# Patient Record
Sex: FEMALE | Race: ASIAN | NOT HISPANIC OR LATINO | URBAN - METROPOLITAN AREA
[De-identification: names, ages, dates, MRNs, and addresses within clinical notes are randomized per-mention and may not be internally consistent; named-entity substitution may affect disease eponyms.]

---

## 2018-04-23 ENCOUNTER — EMERGENCY (EMERGENCY)
Facility: HOSPITAL | Age: 53
LOS: 1 days | Discharge: HOME | End: 2018-04-23
Attending: EMERGENCY MEDICINE | Admitting: EMERGENCY MEDICINE
Payer: MEDICAID

## 2018-04-23 VITALS
SYSTOLIC BLOOD PRESSURE: 160 MMHG | HEART RATE: 88 BPM | OXYGEN SATURATION: 100 % | TEMPERATURE: 98 F | RESPIRATION RATE: 16 BRPM | DIASTOLIC BLOOD PRESSURE: 82 MMHG

## 2018-04-23 PROCEDURE — 99283 EMERGENCY DEPT VISIT LOW MDM: CPT

## 2018-04-23 RX ADMIN — Medication 600 MILLIGRAM(S): at 19:13

## 2018-04-23 NOTE — ED PROVIDER NOTE - MEDICAL DECISION MAKING DETAILS
51y/o F with abscess and cellulitis. Abscess drained in a dermatology office. Recommend Clindamycin to cover for possible MRSA as oral Clindamycin has same bioavailability as IV. Will give oral Clindamycin. Pt given strict return precautions, area marked off. 51y/o F with abscess and cellulitis. Abscess drained in a dermatology office. Recommend Clindamycin to cover for possible MRSA.  As oral Clindamycin has same bioavailability as IV, Will give oral Clindamycin. Pt given strict return precautions, area marked off.

## 2018-04-23 NOTE — ED PROVIDER NOTE - PROGRESS NOTE DETAILS
RONAN Vargas: pt with right shoulder cellulitis after pimple to area. area marked. started on clinda. fu derm. return precautions.

## 2018-04-23 NOTE — ED PROVIDER NOTE - SKIN WOUND DESCRIPTION
Large area of erythema and induration over the R posterior shoulder with a small open area, not actively draining, tender to palpation.

## 2018-04-23 NOTE — ED PROVIDER NOTE - OBJECTIVE STATEMENT
53y/o F with no pertinent PMHx presents to the ED with abscess to R shoulder with pain, surrounding erythema, and purulent drainage with associated subjective fever. Pt noticed a pimple to her back 1w ago, which became enlarged with surrounding erythema and pain. Pt noted some purulent drainage, which she mostly squeezed out herself. Pt visited a dermatologist and was told to present to the ED for possible IV abx. Denies vomiting, any other complaints. NKDA.

## 2024-05-18 ENCOUNTER — EMERGENCY (EMERGENCY)
Facility: HOSPITAL | Age: 59
LOS: 1 days | Discharge: HOME | End: 2024-05-18
Admitting: EMERGENCY MEDICINE
Payer: MEDICAID

## 2024-05-18 VITALS
OXYGEN SATURATION: 100 % | RESPIRATION RATE: 16 BRPM | HEART RATE: 82 BPM | TEMPERATURE: 99 F | DIASTOLIC BLOOD PRESSURE: 75 MMHG | SYSTOLIC BLOOD PRESSURE: 146 MMHG

## 2024-05-18 PROCEDURE — 99283 EMERGENCY DEPT VISIT LOW MDM: CPT

## 2024-05-18 RX ORDER — DIPHENHYDRAMINE HCL 50 MG
1 CAPSULE ORAL
Qty: 28 | Refills: 0
Start: 2024-05-18 | End: 2024-05-24

## 2024-05-18 RX ORDER — DEXAMETHASONE 0.5 MG/5ML
4 ELIXIR ORAL ONCE
Refills: 0 | Status: COMPLETED | OUTPATIENT
Start: 2024-05-18 | End: 2024-05-18

## 2024-05-18 RX ORDER — FAMOTIDINE 10 MG/ML
1 INJECTION INTRAVENOUS
Qty: 7 | Refills: 0
Start: 2024-05-18 | End: 2024-05-24

## 2024-05-18 RX ORDER — DIPHENHYDRAMINE HCL 50 MG
25 CAPSULE ORAL ONCE
Refills: 0 | Status: COMPLETED | OUTPATIENT
Start: 2024-05-18 | End: 2024-05-18

## 2024-05-18 RX ORDER — LORATADINE 10 MG/1
10 TABLET ORAL DAILY
Refills: 0 | Status: DISCONTINUED | OUTPATIENT
Start: 2024-05-18 | End: 2024-05-21

## 2024-05-18 RX ORDER — FAMOTIDINE 10 MG/ML
20 INJECTION INTRAVENOUS DAILY
Refills: 0 | Status: DISCONTINUED | OUTPATIENT
Start: 2024-05-18 | End: 2024-05-21

## 2024-05-18 RX ORDER — CALAMINE AND ZINC OXIDE AND PHENOL 160; 10 MG/ML; MG/ML
1 LOTION TOPICAL
Qty: 1 | Refills: 0
Start: 2024-05-18 | End: 2024-05-24

## 2024-05-18 RX ORDER — CETIRIZINE HYDROCHLORIDE 10 MG/1
1 TABLET ORAL
Qty: 10 | Refills: 0
Start: 2024-05-18 | End: 2024-05-27

## 2024-05-18 RX ADMIN — LORATADINE 10 MILLIGRAM(S): 10 TABLET ORAL at 12:22

## 2024-05-18 RX ADMIN — Medication 25 MILLIGRAM(S): at 12:22

## 2024-05-18 RX ADMIN — FAMOTIDINE 20 MILLIGRAM(S): 10 INJECTION INTRAVENOUS at 12:22

## 2024-05-18 RX ADMIN — Medication 4 MILLIGRAM(S): at 12:22

## 2024-05-18 NOTE — ED PROVIDER NOTE - CLINICAL SUMMARY MEDICAL DECISION MAKING FREE TEXT BOX
This is a 58 y old F, pmh htn with c/o rash, irritation, itchiness for b/l hands and neck for the last 2 weeks. Reports went to [facility] and was prescribed medication. Her rash improved but still has some left. Reports she started to have this rash after using a new type of gloves at work.  Denies sick contact, travel, sob, fever, chills, chest pain, nausea, abd, vomiting.  meds, resources to derm and allergist

## 2024-05-18 NOTE — ED ADULT NURSE NOTE - OBJECTIVE STATEMENT
Received patient in Intake 3 c/o rash w/itchiness, redness on neck, bilateral hands and feet. Patient denies SOB, chest pain, fever. Patient is A&Ox4, airway patent, breathing unlabored and even, radial pulses palpable. Medications given as ordered. Side rails up and safety maintained. Call bells within reach.

## 2024-05-18 NOTE — ED PROVIDER NOTE - NSFOLLOWUPINSTRUCTIONS_ED_ALL_ED_FT
Please follow up with the dermatologist and allergist, resources provided to you.       Contact Dermatitis  Dermatitis is redness, soreness, and swelling (inflammation) of the skin. Contact dermatitis is a reaction to certain substances that touch the skin. There are two types of contact dermatitis:    Irritant contact dermatitis. This type is caused by something that irritates your skin, such as dry hands from washing them too much. This type does not require previous exposure to the substance for a reaction to occur. This type is more common.  Allergic contact dermatitis. This type is caused by a substance that you are allergic to, such as a nickel allergy or poison ivy. This type only occurs if you have been exposed to the substance (allergen) before. Upon a repeat exposure, your body reacts to the substance. This type is less common.    What are the causes?  Many different substances can cause contact dermatitis. Irritant contact dermatitis is most commonly caused by exposure to:    Makeup.  Soaps.  Detergents.  Bleaches.  Acids.  Metal salts, such as nickel.    Allergic contact dermatitis is most commonly caused by exposure to:    Poisonous plants.  Chemicals.  Jewelry.  Latex.  Medicines.  Preservatives in products, such as clothing.    What increases the risk?  This condition is more likely to develop in:    People who have jobs that expose them to irritants or allergens.  People who have certain medical conditions, such as asthma or eczema.    What are the signs or symptoms?  Symptoms of this condition may occur anywhere on your body where the irritant has touched you or is touched by you. Symptoms include:    Dryness or flaking.  Redness.  Cracks.  Itching.  Pain or a burning feeling.  Blisters.  Drainage of small amounts of blood or clear fluid from skin cracks.    With allergic contact dermatitis, there may also be swelling in areas such as the eyelids, mouth, or genitals.    How is this diagnosed?  This condition is diagnosed with a medical history and physical exam. A patch skin test may be performed to help determine the cause. If the condition is related to your job, you may need to see an occupational medicine specialist.    How is this treated?  Treatment for this condition includes figuring out what caused the reaction and protecting your skin from further contact. Treatment may also include:    Steroid creams or ointments. Oral steroid medicines may be needed in more severe cases.  Antibiotics or antibacterial ointments, if a skin infection is present.  Antihistamine lotion or an antihistamine taken by mouth to ease itching.  A bandage (dressing).    Follow these instructions at home:  Skin Care     Moisturize your skin as needed.  Apply cool compresses to the affected areas.  Try taking a bath with:    Epsom salts. Follow the instructions on the packaging. You can get these at your local pharmacy or grocery store.  Baking soda. Pour a small amount into the bath as directed by your health care provider.  Colloidal oatmeal. Follow the instructions on the packaging. You can get this at your local pharmacy or grocery store.    Try applying baking soda paste to your skin. Stir water into baking soda until it reaches a paste-like consistency.  Do not scratch your skin.  Bathe less frequently, such as every other day.  Bathe in lukewarm water. Avoid using hot water.  Medicines     Take or apply over-the-counter and prescription medicines only as told by your health care provider.  If you were prescribed an antibiotic medicine, take or apply your antibiotic as told by your health care provider. Do not stop using the antibiotic even if your condition starts to improve.  General instructions     Keep all follow-up visits as told by your health care provider. This is important.  Avoid the substance that caused your reaction. If you do not know what caused it, keep a journal to try to track what caused it. Write down:    What you eat.  What cosmetic products you use.  What you drink.  What you wear in the affected area. This includes jewelry.    If you were given a dressing, take care of it as told by your health care provider. This includes when to change and remove it.  Contact a health care provider if:  Your condition does not improve with treatment.  Your condition gets worse.  You have signs of infection such as swelling, tenderness, redness, soreness, or warmth in the affected area.  You have a fever.  You have new symptoms.  Get help right away if:  You have a severe headache, neck pain, or neck stiffness.  You vomit.  You feel very sleepy.  You notice red streaks coming from the affected area.  Your bone or joint underneath the affected area becomes painful after the skin has healed.  The affected area turns darker.  You have difficulty breathing.  This information is not intended to replace advice given to you by your health care provider. Make sure you discuss any questions you have with your health care provider.

## 2024-05-18 NOTE — ED ADULT TRIAGE NOTE - CHIEF COMPLAINT QUOTE
pt c/o rash and itchiness to hands, neck and feet x2 weeks. was seen at urgent care- prescribed hydrocortisone cream, prednisone and fexofenadine without relief. Hx. HTN. HLD. pt denies chest pain or sob. pt well appearing.

## 2024-05-18 NOTE — ED PROVIDER NOTE - OBJECTIVE STATEMENT
This is a 58 y old F, pmh htn with c/o rash, irritation, itchiness for b/l hands and neck for the last 2 weeks. Reports went to [facility] and was prescribed medication. Her rash improved but still has some left. Reports she started to have this rash after using a new type of gloves at work.  Denies sick contact, travel, sob, fever, chills, chest pain, nausea, abd, vomiting.

## 2024-05-18 NOTE — ED PROVIDER NOTE - PATIENT PORTAL LINK FT
You can access the FollowMyHealth Patient Portal offered by [facility] by registering at the following website: [email or web address]. By joining [employer]’s FollowMyHealth portal, you will also be able to view your health information using other applications (apps) compatible with our system.